# Patient Record
Sex: MALE | Race: WHITE | URBAN - METROPOLITAN AREA
[De-identification: names, ages, dates, MRNs, and addresses within clinical notes are randomized per-mention and may not be internally consistent; named-entity substitution may affect disease eponyms.]

---

## 2020-10-04 ENCOUNTER — APPOINTMENT (OUTPATIENT)
Dept: CT IMAGING | Facility: HOSPITAL | Age: 30
End: 2020-10-04
Attending: EMERGENCY MEDICINE

## 2020-10-04 PROCEDURE — 70450 CT HEAD/BRAIN W/O DYE: CPT | Performed by: EMERGENCY MEDICINE

## 2020-10-04 NOTE — ED PROVIDER NOTES
Patient Seen in: [facility] AND [facility] Emergency Department    History   Patient presents with:  Alcohol Intoxication    Stated Complaint: etoh     HPI    69-year-old male without known past medical history presenting by EMS after being found passed out in angela extremities equally with fine/further motor examination limited by intoxication. .   Skin: Skin is warm. Psychiatric: Cooperative. Nursing note and vitals reviewed.         ED Course   Labs Reviewed - No data to display  Preliminary Radiology Report  Visi oncoming physician. I was wearing at minimum a facemask and eye protection throughout this encounter with handwashing performed prior and after patient evaluation without personal hand/facial/oropharyngeal contact and gloves worn throughout encounter.  Cleo Hampton
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
## 2020-10-04 NOTE — ED PROVIDER NOTES
Patient endorsed to me by Dr. Raffy Rivera for continuation of care. Patient is now awake and ambulatory. No slurred speech. Patient will be discharged home.